# Patient Record
Sex: FEMALE | Race: WHITE
[De-identification: names, ages, dates, MRNs, and addresses within clinical notes are randomized per-mention and may not be internally consistent; named-entity substitution may affect disease eponyms.]

---

## 2018-04-18 ENCOUNTER — HOSPITAL ENCOUNTER (OUTPATIENT)
Dept: HOSPITAL 43 - DL.SDS | Age: 68
Discharge: HOME | End: 2018-04-18
Attending: OPHTHALMOLOGY
Payer: MEDICARE

## 2018-04-18 DIAGNOSIS — K21.9: ICD-10-CM

## 2018-04-18 DIAGNOSIS — H25.812: Primary | ICD-10-CM

## 2018-04-18 DIAGNOSIS — J45.909: ICD-10-CM

## 2018-04-18 DIAGNOSIS — Z79.899: ICD-10-CM

## 2018-04-18 DIAGNOSIS — E03.9: ICD-10-CM

## 2018-04-18 DIAGNOSIS — E78.5: ICD-10-CM

## 2018-04-18 DIAGNOSIS — Z79.82: ICD-10-CM

## 2018-04-18 DIAGNOSIS — Z88.8: ICD-10-CM

## 2018-04-18 DIAGNOSIS — I25.10: ICD-10-CM

## 2018-04-18 DIAGNOSIS — I10: ICD-10-CM

## 2018-04-18 DIAGNOSIS — Z88.2: ICD-10-CM

## 2018-04-18 DIAGNOSIS — Z88.1: ICD-10-CM

## 2018-04-18 PROCEDURE — V2787 ASTIGMATISM-CORRECT FUNCTION: HCPCS

## 2018-04-18 RX ADMIN — PHENYLEPHRINE HYDROCHLORIDE PRN DROP: 100 SOLUTION/ DROPS OPHTHALMIC at 11:10

## 2018-04-18 RX ADMIN — PHENYLEPHRINE HYDROCHLORIDE PRN DROP: 100 SOLUTION/ DROPS OPHTHALMIC at 11:36

## 2018-04-18 RX ADMIN — PHENYLEPHRINE HYDROCHLORIDE ONE DROP: 100 SOLUTION/ DROPS OPHTHALMIC at 10:59

## 2018-04-18 RX ADMIN — PHENYLEPHRINE HYDROCHLORIDE ONE DROP: 100 SOLUTION/ DROPS OPHTHALMIC at 10:57

## 2018-04-18 NOTE — OR
DATE:  04/18/2018

 

PREOPERATIVE DIAGNOSIS:  Visually significant mixed cataract, left eye.

 

POSTOPERATIVE DIAGNOSIS:  Visually significant mixed cataract, left eye.

 

PROCEDURE:  Extracapsular cataract extraction with intraocular lens implant,

left eye.

 

ANESTHESIA:  Topical/local MAC.

 

COMPLICATIONS:  None.

 

INDICATION:  Ms. Clark was seen in the clinic.  She has complained of

difficulty reading, difficulty seeing the computer, and a progressive change in

vision.  Her clinical examination reveals visually significant cataract with

best spectacle corrected vision of 20/200.  Examination reveals mixed nuclear

and early cortical cataract.  I explained options; I offered cataract surgery;

and I explained risks including the potential for infection, retinal detachment,

and visual loss amongst others.  We discussed implant options.  She has

significant preexisting corneal astigmatism, and she has requested a toric

implant.  She does understand that she may still require glasses for some

activities.

 

OPERATIVE DESCRIPTION:  After informed consent was obtained and the risks,

benefits, and alternatives were explained, the patient was brought to the

operative suite and topical anesthesia was administered.  The patient was then

prepped and draped in the sterile fashion and attention was placed on the left

eye.  A sterile lid speculum was placed into the left eye to allow operative

exposure.  A full-thickness paracentesis was made in the temporal portion of the

operative eye.  Preservative-free lidocaine 0.1 mL was injected into the

anterior chamber followed by viscoelastic.  A full-thickness corneal incision

was then made into the anterior chamber.  A bent needle cystotome was used to

create a small nick in the anterior capsule.  The capsulorrhexis forceps was

then used to create a 360-degree curvilinear capsulorrhexis.  The nucleus was

then removed using a phacoemulsification handpiece, and the remaining cortical

material was then removed with irrigation and aspiration handpiece.  Following

removal of the cortical material, the capsular bag was then inspected and noted

to be free of any holes or tears.  Viscoelastic was then injected into the

capsular bag, and the intraocular lens was inserted into the capsular bag.  The

implant was oriented to correspond with preoperative corneal marks made with the

patient in the upright position.  The viscoelastic material was then removed

from both the anterior and posterior chambers and from behind the IOL.  The lens

and capsular bag were then reinspected.  The IOL was well centered and the

capsular bag intact.  The wound and paracentesis sites were inspected and

hydrated with balanced saline solution.  Both were found to be self-sealing.

The intraocular pressure was assessed digitally and found to be within normal

range.  A good red reflex was noted at the completion of the procedure.  No

complications occurred during the operation.  At the completion of the

procedure, Maxitrol, Voltaren, and Iopidine drops were placed into the operative

eye.  A sterile eye shield was placed over the operative eye, and the patient

was transported to the postoperative recovery area having tolerated the

procedure well.  Postoperative instructions were given along with a

postoperative appointment.  The patient was advised to call with any questions

or concerns.

 

DD:  04/18/2018 12:23:01

DT:  04/18/2018 13:58:27

[facility]

Job #:  [id number]

## 2018-04-25 ENCOUNTER — HOSPITAL ENCOUNTER (OUTPATIENT)
Dept: HOSPITAL 43 - DL.SDS | Age: 68
Discharge: HOME | End: 2018-04-25
Attending: OPHTHALMOLOGY
Payer: MEDICARE

## 2018-04-25 DIAGNOSIS — I10: ICD-10-CM

## 2018-04-25 DIAGNOSIS — H25.811: Primary | ICD-10-CM

## 2018-04-25 DIAGNOSIS — I25.10: ICD-10-CM

## 2018-04-25 DIAGNOSIS — Z88.1: ICD-10-CM

## 2018-04-25 DIAGNOSIS — Z88.2: ICD-10-CM

## 2018-04-25 DIAGNOSIS — Z79.82: ICD-10-CM

## 2018-04-25 DIAGNOSIS — Z88.8: ICD-10-CM

## 2018-04-25 DIAGNOSIS — I25.83: ICD-10-CM

## 2018-04-25 DIAGNOSIS — E78.5: ICD-10-CM

## 2018-04-25 DIAGNOSIS — E03.9: ICD-10-CM

## 2018-04-25 DIAGNOSIS — Z79.899: ICD-10-CM

## 2018-04-25 DIAGNOSIS — K21.9: ICD-10-CM

## 2018-04-25 DIAGNOSIS — J45.909: ICD-10-CM

## 2018-04-25 PROCEDURE — V2787 ASTIGMATISM-CORRECT FUNCTION: HCPCS

## 2018-04-25 PROCEDURE — 66984 XCAPSL CTRC RMVL W/O ECP: CPT

## 2018-04-25 NOTE — OR
DATE:  04/25/2018

 

PREOPERATIVE DIAGNOSIS:  Visually significant mixed cataract, right eye.

 

POSTOPERATIVE DIAGNOSIS:  Visually significant mixed cataract, right eye.

 

PROCEDURE:  Extracapsular cataract extraction with intraocular lens implant,

right eye.

 

ANESTHESIA:  Topical/local MAC.

 

COMPLICATIONS:  None.

 

INDICATION:  Ms. Clark was seen in the clinic with complaints of blurred

vision.  Clinical examination revealed visually significant mixed cataract.  I

explained options; I offered cataract surgery; and I explained risks.  She is

symptomatic and requested surgery.  I explained the potential for infection,

retinal detachment, and loss of vision amongst others.  We discussed implant

options.  She has preexisting corneal astigmatism, and she has requested a toric

implant.  She understands that she may still require glasses for some

activities.

 

OPERATIVE DESCRIPTION:  After informed consent was obtained and the risks,

benefits, and alternatives were explained, the patient was brought to the

operative suite and topical anesthesia was administered.  The patient was then

prepped and draped in the sterile fashion, and attention was placed on the right

eye.  A sterile lid speculum was placed into the right eye to allow operative

exposure.  A full-thickness paracentesis was made in the temporal portion of the

operative eye.  Preservative-free lidocaine 0.1 mL was injected into the

anterior chamber followed by viscoelastic.  A full-thickness corneal incision

was then made into the anterior chamber.  A bent needle cystotome was used to

create a small nick in the anterior capsule.  The capsulorrhexis forceps was

then used to create a 360-degree curvilinear capsulorrhexis.  The nucleus was

then removed using a phacoemulsification handpiece, and the remaining cortical

material was then removed with irrigation and aspiration handpiece.  Following

removal of the cortical material, the capsular bag was then inspected and noted

to be free of any holes or tears.  Viscoelastic was then injected into the

capsular bag, and the intraocular lens was inserted into the capsular bag.

Implant was oriented to correspond with preoperative corneal marks made with the

patient in the upright position.  The viscoelastic material was then removed

from both the anterior and posterior chambers and from behind the IOL.  The lens

and capsular bag were then reinspected.  The IOL was well centered and the

capsular bag intact.  The wound and paracentesis sites were inspected and

hydrated with balanced saline solution.  Both were found to be self-sealing.

The intraocular pressure was assessed digitally and found to be within normal

range.  A good red reflex was noted at the completion of the procedure.  No

complications occurred during the operation.  At the completion of the

procedure, Maxitrol, Voltaren, and Iopidine drops were placed into the operative

eye.  A sterile eye shield was placed over the operative eye, and the patient

was transported to the postoperative recovery area having tolerated the

procedure well.  Postoperative instructions were given along with a

postoperative appointment.  The patient was advised to call with any questions

or concerns.

 

DD:  04/25/2018 08:55:15

DT:  04/25/2018 15:44:22

[facility]

Job #:  [id number]